# Patient Record
(demographics unavailable — no encounter records)

---

## 2024-10-20 NOTE — PLAN
[FreeTextEntry1] : Discussed options for management of cyst including repeat laparoscopic cyst removal surgery with oophorectomy. Pt referred to Dr. Caballero for consultation regarding surgery. All of pt's questions were answered to their apparent satisfaction. Requested that Pt try to get tumor marker and US reports sent over. Pt aware to go to the ER if she has any acute pain.

## 2024-10-20 NOTE — DISCUSSION/SUMMARY
[FreeTextEntry1] :  This note was written by Marlon Cintron on 10/07/2024 actively solely Dr. Tamera Park M.D.   All medical record entries made by this scribe at my, Dr. Tamera Park M.D. direction and personally dictated by me on 10/07/2024. I have personally reviewed the chart and agree that the record reflects my personal performance of the history, physical exam, assessment, and plan.

## 2024-10-20 NOTE — HISTORY OF PRESENT ILLNESS
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 Pelvic sono done on 9/9/24 8.85 x 10.16 x 13.75 cm complex cyst. Tumor markers were collected.    [PapSmeardate] : 09/24 [PGxTotal] : 1 [PGHxABSpont] : 1

## 2024-12-05 NOTE — HISTORY OF PRESENT ILLNESS
[FreeTextEntry1] : 39 yo P0 here for recurrence of right mucinous cyst. H/o ovarian cystectomy 3/22/2023.  From earlier note, " Pelvic sono done on 9/9/24 8.85 x 10.16 x 13.75 cm complex cyst."   Now interested in definitive ovarian surgery. Not actively TTC, but not out of the question.  Pt would like surgery in the morning.

## 2024-12-05 NOTE — PHYSICAL EXAM
[09907] : A chaperone was present during the pelvic exam. [FreeTextEntry7] : Bulky, mobile bass ~13 cm. [Labia Majora] : normal [Normal] : normal [FreeTextEntry6] : Bulky,mobile abd/pelv mass ~14 cm

## 2024-12-05 NOTE — RESULTS/DATA
[TextEntry] : USG report not available. Pt states that it was submitted earlier this year. Pelvic sono done on 9/9/24 8.85 x 10.16 x 13.75 cm complex cyst.

## 2024-12-05 NOTE — DISCUSSION/SUMMARY
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 Now w/mass-effect symptoms. Pt and  concerned about possibility of multiple procedures if we do another conservative procedure. Considering future fertility. Imaging not available. Pt works as Int med NP.  Plan MRI RTO (TEB) for results. Schedule c RSO   Pt aware that surgery may have to be via xlap.   Pt would like to be first case